# Patient Record
Sex: FEMALE | Race: BLACK OR AFRICAN AMERICAN | URBAN - METROPOLITAN AREA
[De-identification: names, ages, dates, MRNs, and addresses within clinical notes are randomized per-mention and may not be internally consistent; named-entity substitution may affect disease eponyms.]

---

## 2020-08-04 ENCOUNTER — TELEPHONE ENCOUNTER (OUTPATIENT)
Dept: URBAN - METROPOLITAN AREA CLINIC 105 | Facility: CLINIC | Age: 65
End: 2020-08-04

## 2020-08-04 ENCOUNTER — ERX REFILL RESPONSE (OUTPATIENT)
Age: 65
End: 2020-08-04

## 2020-08-04 RX ORDER — ESOMEPRAZOLE MAGNESIUM 40 MG/1
TAKE 1 CAPSULE BY MOUTH TWICE A DAY 30 TO 60 MINS PRIOR TO FIRST AND LAST MEALS CAPSULE, DELAYED RELEASE ORAL
Qty: 180 | Refills: 1

## 2020-08-06 ENCOUNTER — LAB OUTSIDE AN ENCOUNTER (OUTPATIENT)
Dept: URBAN - METROPOLITAN AREA CLINIC 105 | Facility: CLINIC | Age: 65
End: 2020-08-06

## 2020-08-06 ENCOUNTER — CLAIMS CREATED FROM THE CLAIM WINDOW (OUTPATIENT)
Dept: URBAN - METROPOLITAN AREA CLINIC 105 | Facility: CLINIC | Age: 65
End: 2020-08-06
Payer: MEDICARE

## 2020-08-06 DIAGNOSIS — R10.13 EPIGASTRIC PAIN: ICD-10-CM

## 2020-08-06 DIAGNOSIS — R63.0 ANOREXIA: ICD-10-CM

## 2020-08-06 DIAGNOSIS — R63.4 WEIGHT LOSS: ICD-10-CM

## 2020-08-06 DIAGNOSIS — R10.9 BILATERAL FLANK PAIN: ICD-10-CM

## 2020-08-06 DIAGNOSIS — K59.09 OTHER CONSTIPATION: ICD-10-CM

## 2020-08-06 DIAGNOSIS — R07.9 CHEST PAIN: ICD-10-CM

## 2020-08-06 DIAGNOSIS — K21.9 GERD: ICD-10-CM

## 2020-08-06 DIAGNOSIS — K59.01 CONSTIPATION: ICD-10-CM

## 2020-08-06 DIAGNOSIS — R09.89 GLOBUS SENSATION: ICD-10-CM

## 2020-08-06 DIAGNOSIS — E27.9 ADRENAL NODULE: ICD-10-CM

## 2020-08-06 DIAGNOSIS — R10.84 ABDOMINAL PAIN, GENERALIZED: ICD-10-CM

## 2020-08-06 PROCEDURE — G9903 PT SCRN TBCO ID AS NON USER: HCPCS | Performed by: INTERNAL MEDICINE

## 2020-08-06 PROCEDURE — G8420 CALC BMI NORM PARAMETERS: HCPCS | Performed by: INTERNAL MEDICINE

## 2020-08-06 PROCEDURE — G8427 DOCREV CUR MEDS BY ELIG CLIN: HCPCS | Performed by: INTERNAL MEDICINE

## 2020-08-06 PROCEDURE — 1036F TOBACCO NON-USER: CPT | Performed by: INTERNAL MEDICINE

## 2020-08-06 PROCEDURE — 99214 OFFICE O/P EST MOD 30 MIN: CPT | Performed by: INTERNAL MEDICINE

## 2020-08-06 PROCEDURE — 3017F COLORECTAL CA SCREEN DOC REV: CPT | Performed by: INTERNAL MEDICINE

## 2020-08-06 RX ORDER — PENICILLIN V POTASSIUM 500 MG/1
1 TABLET TABLET ORAL
Status: ACTIVE | COMMUNITY

## 2020-08-06 RX ORDER — ATORVASTATIN CALCIUM 10 MG/1
TAKE 1 TABLET (10 MG) BY ORAL ROUTE ONCE DAILY AT BEDTIME TABLET, FILM COATED ORAL 1
Qty: 0 | Refills: 0 | Status: ACTIVE | COMMUNITY
Start: 1900-01-01

## 2020-08-06 RX ORDER — LISINOPRIL AND HYDROCHLOROTHIAZIDE TABLETS 20; 12.5 MG/1; MG/1
TAKE 1 TABLET BY ORAL ROUTE ONCE DAILY TABLET ORAL 1
Qty: 0 | Refills: 0 | Status: ACTIVE | COMMUNITY
Start: 1900-01-01

## 2020-08-06 RX ORDER — ESOMEPRAZOLE MAGNESIUM 40 MG/1
TAKE 1 CAPSULE BY MOUTH TWICE A DAY 30 TO 60 MINS PRIOR TO FIRST AND LAST MEALS CAPSULE, DELAYED RELEASE ORAL
Qty: 180 | Refills: 1 | Status: ACTIVE | COMMUNITY

## 2020-08-06 RX ORDER — METOPROLOL SUCCINATE 25 MG/1
TAKE 1 TABLET (25 MG) BY ORAL ROUTE ONCE DAILY TABLET, EXTENDED RELEASE ORAL 1
Qty: 0 | Refills: 0 | Status: ACTIVE | COMMUNITY
Start: 1900-01-01

## 2020-08-06 RX ORDER — HYOSCYAMINE SULFATE 0.12 MG/1
1 TABLET TABLET SUBLINGUAL
Qty: 30 | Refills: 2 | OUTPATIENT
Start: 2020-08-06 | End: 2020-11-03

## 2020-08-06 NOTE — HPI-TODAY'S VISIT:
The patient presents on follow-up for constipation and epigastric burning. She was seen previously at [facility] on 7/31/19 for these symptoms.   On 8/16/19, the patient states she is having issues with her throat and she is concerned about her low sodium level. She takes esomeprazole 40 mg QD which controls her heartburn. She takes Carafate 100mg/mL suspension BID for burning in her stomach which helps somewhat. She has used hyoscyamine twice since leaving the hospital which provided a benefit. She notes loss of appetite and inability to eat much. She has lost 4 lbs over 2 weeks and has to force herself to eat. (Her normal weight is 159 lbs and she is 149 today). She started on Miralax 1 cap/day, but is not taking it now. She has been taking an OTC stool softener 1 pill qhs for 1 month. She had an episode of 6-10 watery BMs in a day on both Miralax and stool softener. She last saw her endocrinologist earlier this year - known adrenal nodule. She is not taking any naprosyn because it upsets her stomach.  She was recently seen at [place] [facility] 8/14 for dysuria x 2 days.  She called the [facility] clinic re constipation on 8/8/19.  On 8/7/19 she was seen at the [facility] for diffuse lower abdominal pain, B/L flank pain, and requested biopsy of a renal mass.  She evidently had an 8/27 appt to see urologist (seen 1/9/18 and renal cysts felt to be benign with follow-up recommended PRN).  She was noted to have constipation.  At 7 pm during ED stay she repeatedly demanded a turkey sandwich and juice.  Tx'ed with Toradol and had pain relief.  D/C'ed on Naprosyn 500 mg BID.  ED provider found it odd that she complained of difficulty eating/drinking, but repeatedly was demanding food.  Admitted to [facility] from 7/31 to 8/1/19 and dx'ed with post infectious IBS - discharged on Nexium 40 mg QD and Levsin.     On 4/21/20, she noted intermittent epigastric/lower substernal pain for the past 2 weeks which she attributed to her "hiatal hernia."  She also had noted some intermittent left sided chest pain.  She stated her PCP recommended Gas-X which did help.  She also noted some intermittent heartburn/regurgitation on esomeprazole 40 mg daily. She was taking the med 1.5 hrs prior to breakfast. She was not using Carafate suspension.  She had hyoscyamine, but stated she had not needed it. She was having a regular BM on a stool softner nightly. She denied anorexia.  She stated her weight was stable at 151 lbs.  She was following with endocrinology re adrenal nodule and was suppose to repeat imaging this year.  Today, she notes swallowing candy with a piece of plastic on 6/4/20.  Since then she notes left/mid jaw discomfort and what sounds like a globus sensation.  She has been to the ED twice and seen two ENTs for this.  She notes having a laryngoscopy because of the sensation in her throat.  She has been prescribed Nystatin, PCN VK BID, and dexamethasone.  She denies any benefit.  She notes bilateral flank and lower abdominal discomfort which is relieved with hyoscyamine.  She is having a regular BM.  She decreased esomeprazole from 40 mg BID to QD since her last visit.  She is no longer taking sucralfate susp.  Labs 8/16/19 - TSH/FT4, CBC, lipase - all normal.  CMP normal except for a calcium 10.8 ULN 10.3, and glucose 105. 8/7/19   CBC, Lipase, CMP all normal, Glucose 119, Na 135.

## 2020-08-07 ENCOUNTER — CLAIMS CREATED FROM THE CLAIM WINDOW (OUTPATIENT)
Dept: URBAN - METROPOLITAN AREA SURGERY CENTER 16 | Facility: SURGERY CENTER | Age: 65
End: 2020-08-07
Payer: MEDICARE

## 2020-08-07 ENCOUNTER — CLAIMS CREATED FROM THE CLAIM WINDOW (OUTPATIENT)
Dept: URBAN - METROPOLITAN AREA CLINIC 4 | Facility: CLINIC | Age: 65
End: 2020-08-07
Payer: MEDICARE

## 2020-08-07 DIAGNOSIS — K21.9 ACID REFLUX: ICD-10-CM

## 2020-08-07 DIAGNOSIS — K44.9 DIAPHRAGMATIC HERNIA: ICD-10-CM

## 2020-08-07 DIAGNOSIS — K22.2 ACQUIRED ESOPHAGEAL RING: ICD-10-CM

## 2020-08-07 DIAGNOSIS — K31.89 ACQUIRED DEFORMITY OF PYLORUS: ICD-10-CM

## 2020-08-07 DIAGNOSIS — K21.0 GASTRO-ESOPHAGEAL REFLUX DISEASE WITH ESOPHAGITIS: ICD-10-CM

## 2020-08-07 DIAGNOSIS — K31.7 POLYP OF STOMACH AND DUODENUM: ICD-10-CM

## 2020-08-07 PROCEDURE — 88305 TISSUE EXAM BY PATHOLOGIST: CPT | Performed by: PATHOLOGY

## 2020-08-07 PROCEDURE — 43239 EGD BIOPSY SINGLE/MULTIPLE: CPT | Performed by: INTERNAL MEDICINE

## 2020-08-07 PROCEDURE — 88312 SPECIAL STAINS GROUP 1: CPT | Performed by: PATHOLOGY

## 2020-08-07 RX ORDER — PENICILLIN V POTASSIUM 500 MG/1
1 TABLET TABLET ORAL
Status: ACTIVE | COMMUNITY

## 2020-08-07 RX ORDER — ATORVASTATIN CALCIUM 10 MG/1
TAKE 1 TABLET (10 MG) BY ORAL ROUTE ONCE DAILY AT BEDTIME TABLET, FILM COATED ORAL 1
Qty: 0 | Refills: 0 | Status: ACTIVE | COMMUNITY
Start: 1900-01-01

## 2020-08-07 RX ORDER — LISINOPRIL AND HYDROCHLOROTHIAZIDE TABLETS 20; 12.5 MG/1; MG/1
TAKE 1 TABLET BY ORAL ROUTE ONCE DAILY TABLET ORAL 1
Qty: 0 | Refills: 0 | Status: ACTIVE | COMMUNITY
Start: 1900-01-01

## 2020-08-07 RX ORDER — HYOSCYAMINE SULFATE 0.12 MG/1
1 TABLET TABLET SUBLINGUAL
Qty: 30 | Refills: 2 | Status: ACTIVE | COMMUNITY
Start: 2020-08-06 | End: 2020-11-03

## 2020-08-07 RX ORDER — ESOMEPRAZOLE MAGNESIUM 40 MG/1
TAKE 1 CAPSULE BY MOUTH TWICE A DAY 30 TO 60 MINS PRIOR TO FIRST AND LAST MEALS CAPSULE, DELAYED RELEASE ORAL
Qty: 180 | Refills: 1 | Status: ACTIVE | COMMUNITY

## 2020-08-07 RX ORDER — METOPROLOL SUCCINATE 25 MG/1
TAKE 1 TABLET (25 MG) BY ORAL ROUTE ONCE DAILY TABLET, EXTENDED RELEASE ORAL 1
Qty: 0 | Refills: 0 | Status: ACTIVE | COMMUNITY
Start: 1900-01-01

## 2021-02-17 ENCOUNTER — OFFICE VISIT (OUTPATIENT)
Dept: URBAN - METROPOLITAN AREA CLINIC 105 | Facility: CLINIC | Age: 66
End: 2021-02-17

## 2022-02-24 ENCOUNTER — TELEPHONE ENCOUNTER (OUTPATIENT)
Dept: URBAN - METROPOLITAN AREA CLINIC 105 | Facility: CLINIC | Age: 67
End: 2022-02-24

## 2022-02-25 ENCOUNTER — TELEPHONE ENCOUNTER (OUTPATIENT)
Dept: URBAN - METROPOLITAN AREA CLINIC 105 | Facility: CLINIC | Age: 67
End: 2022-02-25

## 2022-06-23 ENCOUNTER — OFFICE VISIT (OUTPATIENT)
Dept: URBAN - METROPOLITAN AREA CLINIC 105 | Facility: CLINIC | Age: 67
End: 2022-06-23

## 2022-08-23 ENCOUNTER — OFFICE VISIT (OUTPATIENT)
Dept: URBAN - METROPOLITAN AREA CLINIC 105 | Facility: CLINIC | Age: 67
End: 2022-08-23
Payer: MEDICARE

## 2022-08-23 ENCOUNTER — WEB ENCOUNTER (OUTPATIENT)
Dept: URBAN - METROPOLITAN AREA CLINIC 105 | Facility: CLINIC | Age: 67
End: 2022-08-23

## 2022-08-23 VITALS
HEIGHT: 65 IN | WEIGHT: 161.2 LBS | HEART RATE: 83 BPM | TEMPERATURE: 98 F | SYSTOLIC BLOOD PRESSURE: 132 MMHG | BODY MASS INDEX: 26.86 KG/M2 | DIASTOLIC BLOOD PRESSURE: 79 MMHG

## 2022-08-23 DIAGNOSIS — K21.9 GERD: ICD-10-CM

## 2022-08-23 DIAGNOSIS — R10.13 EPIGASTRIC PAIN: ICD-10-CM

## 2022-08-23 DIAGNOSIS — R07.9 CHEST PAIN: ICD-10-CM

## 2022-08-23 DIAGNOSIS — R10.9 BILATERAL FLANK PAIN: ICD-10-CM

## 2022-08-23 DIAGNOSIS — R63.0 ANOREXIA: ICD-10-CM

## 2022-08-23 DIAGNOSIS — K64.8 INTERNAL HEMORRHOID, BLEEDING: ICD-10-CM

## 2022-08-23 DIAGNOSIS — R09.89 GLOBUS SENSATION: ICD-10-CM

## 2022-08-23 DIAGNOSIS — K59.01 CONSTIPATION: ICD-10-CM

## 2022-08-23 DIAGNOSIS — E27.9 ADRENAL NODULE: ICD-10-CM

## 2022-08-23 DIAGNOSIS — R63.4 WEIGHT LOSS: ICD-10-CM

## 2022-08-23 PROCEDURE — 99214 OFFICE O/P EST MOD 30 MIN: CPT | Performed by: INTERNAL MEDICINE

## 2022-08-23 RX ORDER — HYDROCORTISONE ACETATE 25 MG/1
1 SUPPOSITORY SUPPOSITORY RECTAL
Qty: 10 | Refills: 1 | OUTPATIENT
Start: 2022-08-23 | End: 2022-09-12

## 2022-08-23 RX ORDER — ESOMEPRAZOLE MAGNESIUM 40 MG/1
TAKE 1 CAPSULE BY MOUTH TWICE A DAY 30 TO 60 MINS PRIOR TO FIRST AND LAST MEALS CAPSULE, DELAYED RELEASE ORAL
Qty: 180 | Refills: 1 | Status: ACTIVE | COMMUNITY

## 2022-08-23 RX ORDER — LISINOPRIL AND HYDROCHLOROTHIAZIDE TABLETS 20; 12.5 MG/1; MG/1
TAKE 1 TABLET BY ORAL ROUTE ONCE DAILY TABLET ORAL 1
Qty: 0 | Refills: 0 | Status: ACTIVE | COMMUNITY
Start: 1900-01-01

## 2022-08-23 RX ORDER — ATORVASTATIN CALCIUM 10 MG/1
TAKE 1 TABLET (10 MG) BY ORAL ROUTE ONCE DAILY AT BEDTIME TABLET, FILM COATED ORAL 1
Qty: 0 | Refills: 0 | Status: ACTIVE | COMMUNITY
Start: 1900-01-01

## 2022-08-23 RX ORDER — METOPROLOL SUCCINATE 25 MG/1
TAKE 1 TABLET (25 MG) BY ORAL ROUTE ONCE DAILY TABLET, EXTENDED RELEASE ORAL 1
Qty: 0 | Refills: 0 | Status: ACTIVE | COMMUNITY
Start: 1900-01-01

## 2022-08-23 NOTE — HPI-TODAY'S VISIT:
The patient presents on follow-up for constipation and epigastric burning. She was seen previously at [facility] on 7/31/19 for these symptoms.   On 8/16/19, the patient states she is having issues with her throat and she is concerned about her low sodium level. She takes esomeprazole 40 mg QD which controls her heartburn. She takes Carafate 100mg/mL suspension BID for burning in her stomach which helps somewhat. She has used hyoscyamine twice since leaving the hospital which provided a benefit. She notes loss of appetite and inability to eat much. She has lost 4 lbs over 2 weeks and has to force herself to eat. (Her normal weight is 159 lbs and she is 149 today). She started on Miralax 1 cap/day, but is not taking it now. She has been taking an OTC stool softener 1 pill qhs for 1 month. She had an episode of 6-10 watery BMs in a day on both Miralax and stool softener. She last saw her endocrinologist earlier this year - known adrenal nodule. She is not taking any naprosyn because it upsets her stomach.  She was recently seen at [place] [facility] 8/14 for dysuria x 2 days.  She called the [place] primary care clinic re constipation on 8/8/19.  On 8/7/19 she was seen at the [place] [facility] for diffuse lower abdominal pain, B/L flank pain, and requested biopsy of a renal mass.  She evidently had an 8/27 appt to see urologist (seen 1/9/18 and renal cysts felt to be benign with follow-up recommended PRN).  She was noted to have constipation.  At 7 pm during ED stay she repeatedly demanded a turkey sandwich and juice.  Tx'ed with Toradol and had pain relief.  D/C'ed on Naprosyn 500 mg BID.  ED provider found it odd that she complained of difficulty eating/drinking, but repeatedly was demanding food.  Admitted to [facility] from 7/31 to 8/1/19 and dx'ed with post infectious IBS - discharged on Nexium 40 mg QD and Levsin.     On 4/21/20, she noted intermittent epigastric/lower substernal pain for the past 2 weeks which she attributed to her "hiatal hernia."  She also had noted some intermittent left sided chest pain.  She stated her PCP recommended Gas-X which did help.  She also noted some intermittent heartburn/regurgitation on esomeprazole 40 mg daily. She was taking the med 1.5 hrs prior to breakfast. She was not using Carafate suspension.  She had hyoscyamine, but stated she had not needed it. She was having a regular BM on a stool softner nightly. She denied anorexia.  She stated her weight was stable at 151 lbs.  She was following with endocrinology re adrenal nodule and was suppose to repeat imaging this year.  On 8/6/20, she noted swallowing candy with a piece of plastic on 6/4/20.  Since then she noted left/mid jaw discomfort and what sounded like a globus sensation.  She had been to the ED twice and seen two ENTs for this.  She noted having a laryngoscopy because of the sensation in her throat.  She had been prescribed Nystatin, PCN VK BID, and dexamethasone.  She denied any benefit.  She noted bilateral flank and lower abdominal discomfort which was relieved with hyoscyamine.  She was having a regular BM.  She decreased esomeprazole from 40 mg BID to QD since her last visit.  She was no longer taking sucralfate susp.  HPI: Today, she says reflux is well-controlled on esomeprazole 40 mg QD.  Hemorrhoidal symptom is blood on the tissue with a BM. She is taking a stool softener OTC 1 pill QD which works. She has BMs after drinking lemon juice with water & coffee. She has 3 BMs in the morning - incomplete evacuation with first BM, but the rest are fine.   Labs 2/23/22 - CMP normal except creatinine 1.34, sodium 129, potassium 3.3, GFR 39. 8/16/19 - TSH/FT4, CBC, lipase - all normal.  CMP normal except for a calcium 10.8 ULN 10.3, and glucose 105. 8/7/19   CBC, Lipase, CMP all normal, Glucose 119, Na 135.

## 2022-08-27 ENCOUNTER — DASHBOARD ENCOUNTERS (OUTPATIENT)
Age: 67
End: 2022-08-27

## 2022-08-27 PROBLEM — 235595009 GASTROESOPHAGEAL REFLUX DISEASE: Status: ACTIVE | Noted: 2022-08-23

## 2022-08-27 PROBLEM — 30171000 DISORDER OF ADRENAL GLAND: Status: ACTIVE | Noted: 2022-08-23

## 2022-08-27 PROBLEM — 14760008 CONSTIPATION: Status: ACTIVE | Noted: 2022-08-23

## 2022-08-27 PROBLEM — 79890006 ANOREXIA: Status: ACTIVE | Noted: 2022-08-23
